# Patient Record
Sex: FEMALE | URBAN - METROPOLITAN AREA
[De-identification: names, ages, dates, MRNs, and addresses within clinical notes are randomized per-mention and may not be internally consistent; named-entity substitution may affect disease eponyms.]

---

## 2019-04-03 ENCOUNTER — APPOINTMENT (OUTPATIENT)
Dept: URBAN - METROPOLITAN AREA CLINIC 237 | Age: 79
Setting detail: DERMATOLOGY
End: 2019-04-15

## 2019-04-03 PROBLEM — C44.01 BASAL CELL CARCINOMA OF SKIN OF LIP: Status: ACTIVE | Noted: 2019-04-03

## 2019-04-03 PROCEDURE — OTHER OTHER: OTHER

## 2019-04-03 PROCEDURE — 11102 TANGNTL BX SKIN SINGLE LES: CPT

## 2019-04-03 PROCEDURE — OTHER SCALLOP BIOPSY: OTHER

## 2019-04-03 PROCEDURE — OTHER PATIENT SPECIFIC COUNSELING: OTHER

## 2019-04-03 PROCEDURE — OTHER MIPS QUALITY: OTHER

## 2019-04-03 PROCEDURE — OTHER EDUCATIONAL RESOURCES PROVIDED: OTHER

## 2019-04-03 NOTE — PROCEDURE: PATIENT SPECIFIC COUNSELING
MD believes pt presents w/ BCC, poss sclerotic type since lesion is so hard. Lesion appears to extend all the way into the lower lip w/ a large, visible surface area of abnormality. Need bx to diagnose. Advised this will likely need further tx: prob Mohs.\\n\\nIt would be beneficial for pt to have future reexamination, especially if results come back positive.
Detail Level: Zone

## 2019-04-03 NOTE — PROCEDURE: MIPS QUALITY
Quality 110: Preventive Care And Screening: Influenza Immunization: Influenza Immunization Administered during Influenza season
Detail Level: Detailed
Quality 474: Zoster Vaccination Status: Shingrix Vaccination Administered or Previously Received
Quality 111:Pneumonia Vaccination Status For Older Adults: Pneumococcal Vaccination Previously Received

## 2019-04-03 NOTE — PROCEDURE: OTHER
Note Text (......Xxx Chief Complaint.): This diagnosis correlates with the SKs below bra line.
Detail Level: Simple
Other (Free Text): Bx photo taken.

## 2019-04-03 NOTE — PROCEDURE: SCALLOP BIOPSY
Biopsy Type: H and E
X Size Of Lesion In Cm (Optional): 2
Wound Care: Vaseline
Biopsy Method: Personna blade
Anesthesia Volume In Cc (Will Not Render If 0): 0.3
Bill [id number] For Specimen Handling/Conveyance To Laboratory?: no
Billing Type: Third-Party Bill
Depth Of Biopsy: dermis
Size Of Lesion In Cm (Optional): 3.5
Hemostasis: Drysol
Post-Care Instructions: It is best not to let a scab form. Soak with H2O2 if a crust starts to form.\\n\\nA written wound care sheet was given to the patient.
Consent: Written consent was obtained and risks were reviewed including but not limited to scarring (including too thick or thin), bleeding, color change,incomplete removal, recurrence, and infection.
Additional Anesthesia Volume In Cc (Will Not Render If 0): 0
Render Post-Care Instructions In Note?: yes
Notification Instructions: Patient should call the office if not heard from us in one week. If anything serious is found, we will call pt.
Detail Level: Detailed
Anesthesia Type: 1% lidocaine with epinephrine and a 1:10 solution of 8.4% sodium bicarbonate